# Patient Record
(demographics unavailable — no encounter records)

---

## 2025-04-10 NOTE — ADDENDUM
[FreeTextEntry1] : This note was written by Katarzyna Macias on 04/10/2025 acting as a medical scribe for Dr. Julio Garza MD. All medical entries made by the scribe were at my, Dr. Julio Garza's, direction and personally dictated by me on 04/10/2025. I have reviewed the chart and agree that the record accurately reflects my personal performance of the history, review of systems, assessment, and plan. I have also personally directed, reviewed, and agreed with the chart.

## 2025-04-10 NOTE — HEALTH RISK ASSESSMENT
[Yes] : Yes [2 - 4 times a month (2 pts)] : 2-4 times a month (2 points) [1 or 2 (0 pts)] : 1 or 2 (0 points) [Never (0 pts)] : Never (0 points) [No] : In the past 12 months have you used drugs other than those required for medical reasons? No [0] : 2) Feeling down, depressed, or hopeless: Not at all (0) [PHQ-2 Negative - No further assessment needed] : PHQ-2 Negative - No further assessment needed [Never] : Never

## 2025-04-10 NOTE — HISTORY OF PRESENT ILLNESS
[FreeTextEntry1] :  The patient comes in for a yearly wellness evaluation. [de-identified] : The patient is feeling well at this time. The patient has a history of mild persistent asthma for which he is maintained on Advair 230-21 MCG/ACT 2 puffs BID. There has been no cough, sputum production, or wheeze. There have been no recent exacerbations of the patient's asthma. He has not required the use of his Albuterol rescue inhaler.   The patient has a history of hypothyroidism for which he is maintained on Levothyroxine 50 MCG and 25 MCG, alternating every other day. He has been compliant in taking the LT4 daily, away from food or any medication that may inhibit absorption. He has tolerated this medication well without any apparent adverse effects. He denies any temperature intolerance, significant weight changes, or severe fatigue. He in addition denies any palpitations, tremors, anxiousness, change in bowel habits or significant change in moods.  The patient has a history of a pituitary microadenoma for which he is maintained on Cabergoline 0.5 MG BIW. He is followed closely by his endocrinologist, Dr. Sherman.  He undergoes periodic MRIs.  The patient has a history of GERD for which he takes Prilosec 20 MG once daily. He denies any recent reflux or related symptomology.   Additionally, the patient has a history of anxiety for which he is maintained on Fluoxetine 40 MG once daily and Alprzolam 0.5 MG once daily. He reports that he is stable in this regard.   His appetite is good, and his weight is stable. There have been no cough, fevers, chills, or night sweats. There have been no other acute constitutional symptoms. He comes in for this assessment.  The patient does consume alcohol on a regular basis. He does wear a seatbelt when in a motor vehicle. There is no evidence of depression or suicidal ideations. The patient reports a very social life and denies any loneliness or isolation. He denies any difficulty carrying out daily activities such as dressing and grooming. He also denies any difficulty ambulating on a daily basis. Additionally, he denies any issues performing instrumental daily activities such as using the phone, doing laundry, housekeeping, driving, managing medications, shopping, or handling finances.  Last CPE: 2/15/24  Colonoscopy: n/a  Ophthalmology: n/a  Dermatology: due  Dentist: n/a  Flu: due in fall  Tdap: up to date  COVID: due in fall  Diet: regular  Exercise: does not exercise

## 2025-04-10 NOTE — HISTORY OF PRESENT ILLNESS
[FreeTextEntry1] :  The patient comes in for a yearly wellness evaluation. [de-identified] : The patient is feeling well at this time. The patient has a history of mild persistent asthma for which he is maintained on Advair 230-21 MCG/ACT 2 puffs BID. There has been no cough, sputum production, or wheeze. There have been no recent exacerbations of the patient's asthma. He has not required the use of his Albuterol rescue inhaler.   The patient has a history of hypothyroidism for which he is maintained on Levothyroxine 50 MCG and 25 MCG, alternating every other day. He has been compliant in taking the LT4 daily, away from food or any medication that may inhibit absorption. He has tolerated this medication well without any apparent adverse effects. He denies any temperature intolerance, significant weight changes, or severe fatigue. He in addition denies any palpitations, tremors, anxiousness, change in bowel habits or significant change in moods.  The patient has a history of a pituitary microadenoma for which he is maintained on Cabergoline 0.5 MG BIW. He is followed closely by his endocrinologist, Dr. Sherman.  He undergoes periodic MRIs.  The patient has a history of GERD for which he takes Prilosec 20 MG once daily. He denies any recent reflux or related symptomology.   Additionally, the patient has a history of anxiety for which he is maintained on Fluoxetine 40 MG once daily and Alprzolam 0.5 MG once daily. He reports that he is stable in this regard.   His appetite is good, and his weight is stable. There have been no cough, fevers, chills, or night sweats. There have been no other acute constitutional symptoms. He comes in for this assessment.  The patient does consume alcohol on a regular basis. He does wear a seatbelt when in a motor vehicle. There is no evidence of depression or suicidal ideations. The patient reports a very social life and denies any loneliness or isolation. He denies any difficulty carrying out daily activities such as dressing and grooming. He also denies any difficulty ambulating on a daily basis. Additionally, he denies any issues performing instrumental daily activities such as using the phone, doing laundry, housekeeping, driving, managing medications, shopping, or handling finances.  Last CPE: 2/15/24  Colonoscopy: n/a  Ophthalmology: n/a  Dermatology: due  Dentist: n/a  Flu: due in fall  Tdap: up to date  COVID: due in fall  Diet: regular  Exercise: does not exercise

## 2025-04-10 NOTE — PLAN
[FreeTextEntry1] : 1. Continue current medications as outlined above.  2. The patient received the TDAP booster in office today, 4/10/25.    3. Follow up in 6 months with PFT and bloodwork including a BMP.  We will reevaluate his renal function at that time.   4. Continue to follow with endocrinologist, Dr. Sherman, for pituitary microadenoma and hypothyroidism.  5. Follow up with dermatology for total body skin exam.    6. The Influenza and COVID vaccines are recommended in the fall.   7. Cardiovascular exercise as tolerated.

## 2025-04-10 NOTE — PHYSICAL EXAM
[No Rash] : no rash [Normal Gait] : normal gait [Coordination Grossly Intact] : coordination grossly intact [Normal Affect] : the affect was normal [Normal Insight/Judgement] : insight and judgment were intact [General Appearance - Alert] : alert [General Appearance - In No Acute Distress] : in no acute distress [Sclera] : the sclera and conjunctiva were normal [PERRL With Normal Accommodation] : pupils were equal in size, round, and reactive to light [Extraocular Movements] : extraocular movements were intact [Outer Ear] : the ears and nose were normal in appearance [Oropharynx] : the oropharynx was normal [Neck Appearance] : the appearance of the neck was normal [Neck Cervical Mass (___cm)] : no neck mass was observed [Jugular Venous Distention Increased] : there was no jugular-venous distention [Thyroid Diffuse Enlargement] : the thyroid was not enlarged [Thyroid Nodule] : there were no palpable thyroid nodules [Auscultation Breath Sounds / Voice Sounds] : lungs were clear to auscultation bilaterally [Heart Rate And Rhythm] : heart rate was normal and rhythm regular [Heart Sounds] : normal S1 and S2 [Heart Sounds Gallop] : no gallops [Murmurs] : no murmurs [Heart Sounds Pericardial Friction Rub] : no pericardial rub [Arterial Pulses Carotid] : carotid pulses were normal with no bruits [Edema] : there was no peripheral edema [Bowel Sounds] : normal bowel sounds [Abdomen Soft] : soft [Abdomen Tenderness] : non-tender [] : no hepato-splenomegaly [Abdomen Mass (___ Cm)] : no abdominal mass palpated [Nail Clubbing] : no clubbing  or cyanosis of the fingernails [No CVA Tenderness] : no ~M costovertebral angle tenderness [No Spinal Tenderness] : no spinal tenderness